# Patient Record
Sex: FEMALE | Race: WHITE | NOT HISPANIC OR LATINO | Employment: OTHER | URBAN - METROPOLITAN AREA
[De-identification: names, ages, dates, MRNs, and addresses within clinical notes are randomized per-mention and may not be internally consistent; named-entity substitution may affect disease eponyms.]

---

## 2021-11-30 ENCOUNTER — OFFICE VISIT (OUTPATIENT)
Dept: OBSTETRICS AND GYNECOLOGY | Age: 26
End: 2021-11-30

## 2021-11-30 VITALS
BODY MASS INDEX: 53.92 KG/M2 | HEIGHT: 62 IN | WEIGHT: 293 LBS | SYSTOLIC BLOOD PRESSURE: 134 MMHG | DIASTOLIC BLOOD PRESSURE: 82 MMHG

## 2021-11-30 DIAGNOSIS — Z76.89 ENCOUNTER TO ESTABLISH CARE: ICD-10-CM

## 2021-11-30 DIAGNOSIS — N94.89 OTHER SPECIFIED CONDITIONS ASSOCIATED WITH FEMALE GENITAL ORGANS AND MENSTRUAL CYCLE: ICD-10-CM

## 2021-11-30 DIAGNOSIS — Z80.41 FAMILY HISTORY OF OVARIAN CANCER: ICD-10-CM

## 2021-11-30 DIAGNOSIS — Z01.419 ENCOUNTER FOR GYNECOLOGICAL EXAMINATION (GENERAL) (ROUTINE) WITHOUT ABNORMAL FINDINGS: Primary | ICD-10-CM

## 2021-11-30 DIAGNOSIS — N92.6 IRREGULAR MENSES: ICD-10-CM

## 2021-11-30 DIAGNOSIS — Z12.4 SCREENING FOR CERVICAL CANCER: ICD-10-CM

## 2021-11-30 LAB
B-HCG UR QL: NEGATIVE
EXPIRATION DATE: NORMAL
INTERNAL NEGATIVE CONTROL: NEGATIVE
INTERNAL POSITIVE CONTROL: POSITIVE
Lab: NORMAL

## 2021-11-30 PROCEDURE — 81025 URINE PREGNANCY TEST: CPT | Performed by: OBSTETRICS & GYNECOLOGY

## 2021-11-30 PROCEDURE — 99213 OFFICE O/P EST LOW 20 MIN: CPT | Performed by: OBSTETRICS & GYNECOLOGY

## 2021-11-30 PROCEDURE — 3008F BODY MASS INDEX DOCD: CPT | Performed by: OBSTETRICS & GYNECOLOGY

## 2021-11-30 PROCEDURE — 99385 PREV VISIT NEW AGE 18-39: CPT | Performed by: OBSTETRICS & GYNECOLOGY

## 2021-11-30 RX ORDER — BLOOD-GLUCOSE METER
EACH MISCELLANEOUS
COMMUNITY
Start: 2021-09-10

## 2021-11-30 RX ORDER — BLOOD SUGAR DIAGNOSTIC
1 STRIP MISCELLANEOUS DAILY
COMMUNITY
Start: 2021-09-10

## 2021-11-30 RX ORDER — RITUXIMAB 10 MG/ML
INJECTION, SOLUTION INTRAVENOUS
COMMUNITY
Start: 2021-08-16

## 2021-11-30 RX ORDER — LEVOTHYROXINE SODIUM 0.15 MG/1
150 TABLET ORAL DAILY
COMMUNITY

## 2021-11-30 RX ORDER — BUPROPION HYDROCHLORIDE 150 MG/1
150 TABLET ORAL DAILY
COMMUNITY
Start: 2021-03-09 | End: 2022-03-09

## 2021-11-30 RX ORDER — METFORMIN HYDROCHLORIDE 500 MG/1
2 TABLET, EXTENDED RELEASE ORAL 2 TIMES DAILY
COMMUNITY
Start: 2021-09-09

## 2021-11-30 RX ORDER — LANCETS
EACH MISCELLANEOUS
COMMUNITY
Start: 2021-09-10

## 2021-11-30 RX ORDER — CLONIDINE HYDROCHLORIDE 0.1 MG/1
TABLET ORAL
COMMUNITY
Start: 2021-06-13

## 2021-11-30 RX ORDER — FLUTICASONE PROPIONATE 50 MCG
SPRAY, SUSPENSION (ML) NASAL
COMMUNITY
Start: 2021-08-30

## 2021-11-30 RX ORDER — FUROSEMIDE 40 MG/1
40 TABLET ORAL DAILY
COMMUNITY
Start: 2021-06-28

## 2021-11-30 RX ORDER — LAMOTRIGINE 25 MG/1
100 TABLET ORAL
COMMUNITY
Start: 2021-11-16

## 2021-11-30 NOTE — PROGRESS NOTES
"Routine Annual Visit    2021    Patient: Amanda Gabehart          MR#:[id number]      Chief Complaint   Patient presents with   • Gynecologic Exam     NGYN - AE today, Last pap several years ago, LMP 10/03/2021   • Establish Care       History of Present Illness    26 y.o. female  who presents for annual exam.   New pt see written gyn  Needs AE and pap but also has multiple concerns    1. 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 Possible infertility , see above  Will check SA and labs and US    3. Possible PCOS- habitus fits with diagnosis, menses have been regular up to recent    4. FH ovarian cancer  Aunt with rapid progression of ovarian cancer and  age 61  No BRCA testing , will test pt today    5. Pt disabled due to her chronic lung illness  She does desire pregnancy if possible   Doing pulmonary rehab 3 times weekly and has lost 25 lbs            Patient's last menstrual period was 10/03/2021 (exact date).  Obstetric History:  OB History        0    Para   0    Term   0       0    AB   0    Living   0       SAB   0    IAB   0    Ectopic   0    Molar   0    Multiple   0    Live Births   0               Menstrual History:     Patient's last menstrual period was 10/03/2021 (exact date).       Sexual History:       ________________________________________  There is no problem list on file for this patient.      Past Medical History:   Diagnosis Date   • Anxiety    • Borderline personality disorder (HCC)    • Depression    • Disease of thyroid gland    • Lung disease     GPA   • Lymphedema    • Polycystic ovary syndrome    • Sleep apnea        Past Surgical History:   Procedure Laterality Date   • BRONCHOSCOPY     • LUNG BIOPSY     • " "PORTACATH PLACEMENT      Placed and removed       Social History     Tobacco Use   Smoking Status Never Smoker   Smokeless Tobacco Never Used       has a current medication list which includes the following prescription(s): accu-chek softclix lancets, accu-chek guide me, bupropion xl, clonidine, accu-chek guide, lamotrigine, levothyroxine, metformin er, rituxan, sertraline, fluticasone, and furosemide.  ________________________________________    Current contraception: none  History of abnormal Pap smear: no  Family history of Breast cancer: no        The following portions of the patient's history were reviewed and updated as appropriate: allergies, current medications, past family history, past medical history, past social history, past surgical history and problem list.    Review of Systems    Pertinent items are noted in HPI.     Objective   Physical Exam    /82   Ht 157.5 cm (62\")   Wt (!) 160 kg (353 lb 6.4 oz)   LMP 10/03/2021 (Exact Date)   Breastfeeding No   BMI 64.64 kg/m²    BP Readings from Last 3 Encounters:   11/30/21 134/82      Wt Readings from Last 3 Encounters:   11/30/21 (!) 160 kg (353 lb 6.4 oz)      BMI: Estimated body mass index is 64.64 kg/m² as calculated from the following:    Height as of this encounter: 157.5 cm (62\").    Weight as of this encounter: 160 kg (353 lb 6.4 oz).      General:   alert, appears stated age and cooperative   Abdomen: soft, non-tender, without masses or organomegaly   Breast: inspection negative, no nipple discharge or bleeding, no masses or nodularity palpable   Vulva: normal   Vagina: normal mucosa   Cervix: no cervical motion tenderness, no lesions and difficult to visualize cervix   Uterus: normal size, mobile, non-tender and exam is limited habitus    Adnexa: exam limited by habitus     Assessment:    1. Normal annual exam   Assessment     ICD-10-CM ICD-9-CM   1. Encounter for gynecological examination (general) (routine) without abnormal findings  " Z01.419 V72.31   2. Irregular menses  N92.6 626.4   3. Encounter to establish care  Z76.89 V65.8   4. Screening for cervical cancer  Z12.4 V76.2   5. Other specified conditions associated with female genital organs and menstrual cycle   N94.89 629.89   6. Family history of ovarian cancer  Z80.41 V16.41     Plan:    Plan       [x]  PAP done  [x]  Labs:   []  GC/Chl/TV          Diagnoses and all orders for this visit:    1. Encounter for gynecological examination (general) (routine) without abnormal findings (Primary)    2. Irregular menses  -     POC Pregnancy, Urine  -     HCG, B-subunit, Quantitative  -     Antimullerian Hormone (AMH)  -     Follicle Stimulating Hormone  -     TSH    3. Encounter to establish care    4. Screening for cervical cancer  -     IGP, Rfx Aptima HPV ASCU    5. Other specified conditions associated with female genital organs and menstrual cycle   -     HCG, B-subunit, Quantitative    6. Family history of ovarian cancer  -     INVITAE HEREDITARY CANCER; Future      Follow up 3 weeks for gyn US and review all results    Counseling:  --Nutrition: Stressed importance of moderation and caloric balance, stressed fresh fruit and vegetables  --Exercise: Stressed the importance of regular exercise. 3-5 times weekly       --Discussed pap smear screening recommendations

## 2021-12-02 LAB
CONV .: NORMAL
CYTOLOGIST CVX/VAG CYTO: NORMAL
CYTOLOGY CVX/VAG DOC CYTO: NORMAL
CYTOLOGY CVX/VAG DOC THIN PREP: NORMAL
DX ICD CODE: NORMAL
HIV 1 & 2 AB SER-IMP: NORMAL
OTHER STN SPEC: NORMAL
STAT OF ADQ CVX/VAG CYTO-IMP: NORMAL

## 2021-12-04 LAB
FSH SERPL-ACNC: 13.1 MIU/ML
HCG INTACT+B SERPL-ACNC: <1 MIU/ML
MIS SERPL-MCNC: 0.17 NG/ML
TSH SERPL DL<=0.005 MIU/L-ACNC: 2.41 UIU/ML (ref 0.45–4.5)

## 2021-12-06 ENCOUNTER — TELEPHONE (OUTPATIENT)
Dept: OBSTETRICS AND GYNECOLOGY | Age: 26
End: 2021-12-06

## 2021-12-06 NOTE — TELEPHONE ENCOUNTER
Spoke with pt  AMH is low, ? Long term treatment for her lung syndrome  Pt says FH of fertility as well  Recommend RE referral   Pt to consider   will do SA soon.

## 2021-12-21 ENCOUNTER — OFFICE VISIT (OUTPATIENT)
Dept: OBSTETRICS AND GYNECOLOGY | Age: 26
End: 2021-12-21

## 2021-12-21 VITALS
HEIGHT: 62 IN | WEIGHT: 293 LBS | BODY MASS INDEX: 53.92 KG/M2 | DIASTOLIC BLOOD PRESSURE: 80 MMHG | SYSTOLIC BLOOD PRESSURE: 130 MMHG

## 2021-12-21 DIAGNOSIS — N92.6 IRREGULAR MENSES: Primary | ICD-10-CM

## 2021-12-21 PROCEDURE — 99212 OFFICE O/P EST SF 10 MIN: CPT | Performed by: OBSTETRICS & GYNECOLOGY

## 2021-12-21 RX ORDER — PREDNISONE 10 MG/1
TABLET ORAL
COMMUNITY
Start: 2021-12-13 | End: 2022-01-12

## 2021-12-21 RX ORDER — PIMECROLIMUS 10 MG/G
CREAM TOPICAL
COMMUNITY
Start: 2021-12-16

## 2021-12-21 RX ORDER — IPRATROPIUM BROMIDE AND ALBUTEROL SULFATE 2.5; .5 MG/3ML; MG/3ML
3 SOLUTION RESPIRATORY (INHALATION)
COMMUNITY
Start: 2021-12-03 | End: 2022-12-03

## 2021-12-21 RX ORDER — IPRATROPIUM BROMIDE AND ALBUTEROL SULFATE 2.5; .5 MG/3ML; MG/3ML
SOLUTION RESPIRATORY (INHALATION)
COMMUNITY
Start: 2021-12-03

## 2021-12-21 NOTE — PROGRESS NOTES
GYN Visit    2021    Patient: Amanda Gabehart          MR#:[id number]      Chief Complaint   Patient presents with   • Follow-up     U/S Today, Follow up to go over patient results.       History of Present Illness    26 y.o. female  who presents for  Follow up to irregular menses   US done today  No menses yet, has skipped almost 2 cycles  No HF or NS  Reviewed labs and US done today  US today is normal with thin lining  Pt also recently had flare of lung disease and is on prednisone    Reviewed low AMH and implications, mother went through menopause at age 38  Pt has had chemotherapy for her lung disease and no ovarian protection     hasnt done SA yet        Patient's last menstrual period was 2021.    ________________________________________  There is no problem list on file for this patient.      Past Medical History:   Diagnosis Date   • Anxiety    • Borderline personality disorder (HCC)    • Depression    • Disease of thyroid gland    • Lung disease     GPA   • Lymphedema    • Polycystic ovary syndrome    • Sleep apnea        Past Surgical History:   Procedure Laterality Date   • BRONCHOSCOPY     • LUNG BIOPSY     • PORTACATH PLACEMENT      Placed and removed       Social History     Tobacco Use   Smoking Status Never Smoker   Smokeless Tobacco Never Used       has a current medication list which includes the following prescription(s): accu-chek softclix lancets, accu-chek guide me, bupropion xl, clonidine, fluticasone, furosemide, accu-chek guide, ipratropium-albuterol, ipratropium-albuterol, lamotrigine, levothyroxine, metformin er, pimecrolimus, prednisone, rituxan, and sertraline.  ________________________________________    Current contraception: none      The following portions of the patient's history were reviewed and updated as appropriate: allergies, current medications, past family history, past medical history, past social history, past surgical history and problem  "list.    Review of Systems    Pertinent items are noted in HPI.     Objective   Physical Exam    /80   Ht 157.5 cm (62\")   Wt (!) 157 kg (345 lb 12.8 oz)   LMP 11/19/2021   Breastfeeding No   BMI 63.25 kg/m²    BP Readings from Last 3 Encounters:   12/21/21 130/80   11/30/21 134/82      Wt Readings from Last 3 Encounters:   12/21/21 (!) 157 kg (345 lb 12.8 oz)   11/30/21 (!) 160 kg (353 lb 6.4 oz)      BMI: Estimated body mass index is 63.25 kg/m² as calculated from the following:    Height as of this encounter: 157.5 cm (62\").    Weight as of this encounter: 157 kg (345 lb 12.8 oz).    Lungs: non labored breathing, no wheezing or tachpnea  Extremities: extremities normal, atraumatic, no cyanosis or edema  Skin: Skin color, texture, turgor normal. No rashes or lesions  Neurologic: Grossly normal  General:   alert, appears stated age and cooperative   Abdomen: soft, non-tender, without masses or organomegaly       Vulva: normal   Vagina: normal mucosa   Cervix: no cervical motion tenderness   Uterus: normal size, mobile and non-tender   Adnexa: no mass, fullness, tenderness     Assessment:      Diagnoses and all orders for this visit:    1. Irregular menses (Primary)      See US normal     Offered provera WD , pt declines, wants to just observe    Fu 3 months, pts  to do SA  Reviewed normal US  If pt desires pregnancy recommend seeing RE        "

## 2022-03-31 ENCOUNTER — OFFICE VISIT (OUTPATIENT)
Dept: OBSTETRICS AND GYNECOLOGY | Age: 27
End: 2022-03-31

## 2022-03-31 VITALS
DIASTOLIC BLOOD PRESSURE: 84 MMHG | BODY MASS INDEX: 53.92 KG/M2 | WEIGHT: 293 LBS | HEIGHT: 62 IN | SYSTOLIC BLOOD PRESSURE: 128 MMHG

## 2022-03-31 DIAGNOSIS — N92.6 IRREGULAR MENSES: ICD-10-CM

## 2022-03-31 DIAGNOSIS — L29.2 VULVAR ITCHING: Primary | ICD-10-CM

## 2022-03-31 PROCEDURE — 99213 OFFICE O/P EST LOW 20 MIN: CPT | Performed by: OBSTETRICS & GYNECOLOGY

## 2022-03-31 NOTE — PROGRESS NOTES
"GYN Visit    3/31/2022    Patient: Amanda Gabehart          MR#:[id number]      Chief Complaint   Patient presents with   • Follow-up     3 Month GYN Recheck, pt C/O severe itching       History of Present Illness    27 y.o. female  who presents for follow up and new problem of vulvar itching     Monthly cycles x 3    did not do SA  Not preventing pregnancy    Complaints of intense itching, anterior vulvar area, for past 3 months  No discharge, no odor      Patient's last menstrual period was 2022 (exact date).    ________________________________________  There is no problem list on file for this patient.      Past Medical History:   Diagnosis Date   • Anxiety    • Borderline personality disorder (HCC)    • Depression    • Disease of thyroid gland    • Lung disease     GPA   • Lymphedema    • Polycystic ovary syndrome    • Sleep apnea        Past Surgical History:   Procedure Laterality Date   • BRONCHOSCOPY     • LUNG BIOPSY     • PORTACATH PLACEMENT      Placed and removed       Social History     Tobacco Use   Smoking Status Never Smoker   Smokeless Tobacco Never Used       has a current medication list which includes the following prescription(s): clonidine, fluticasone, furosemide, accu-chek guide, ipratropium-albuterol, ipratropium-albuterol, lamotrigine, levothyroxine, metformin er, pimecrolimus, rituxan, sertraline, accu-chek softclix lancets, betamethasone valerate, accu-chek guide me, and bupropion xl.  ________________________________________    Current contraception: none      The following portions of the patient's history were reviewed and updated as appropriate: allergies, current medications, past family history, past medical history, past social history, past surgical history and problem list.    Review of Systems    Pertinent items are noted in HPI.     Objective   Physical Exam  Genitourinary:            /84   Ht 157.5 cm (62\")   Wt (!) 154 kg (339 lb)   LMP 2022 " "(Exact Date)   Breastfeeding No   BMI 62.00 kg/m²    BP Readings from Last 3 Encounters:   03/31/22 128/84   12/21/21 130/80   11/30/21 134/82      Wt Readings from Last 3 Encounters:   03/31/22 (!) 154 kg (339 lb)   12/21/21 (!) 157 kg (345 lb 12.8 oz)   11/30/21 (!) 160 kg (353 lb 6.4 oz)      BMI: Estimated body mass index is 62 kg/m² as calculated from the following:    Height as of this encounter: 157.5 cm (62\").    Weight as of this encounter: 154 kg (339 lb).    Lungs: non labored breathing, no wheezing or tachpnea  Extremities: extremities normal, atraumatic, no cyanosis or edema  Skin: Skin color, texture, turgor normal. No rashes or lesions  Neurologic: Grossly normal  General:   alert, appears stated age and cooperative   Abdomen: soft, non-tender, without masses or organomegaly       Vulva: erythema anterior vulva, no mass,   Vagina: normal mucosa, normal discharge                 Assessment:      Diagnoses and all orders for this visit:    1. Vulvar itching (Primary)    2. Irregular menses    Other orders  -     betamethasone valerate (VALISONE) 0.1 % ointment; Apply 1 application topically to the appropriate area as directed 2 (Two) Times a Day. Apply BID for 1 week for flare up , apply 2-3 times weekly for maintenance  Dispense: 45 g; Refill: 1      Suspect eczema - will do steroid ointment  Irregular menses seems to have resolved  Pt on biologic every 6 months, CI in pregnancy- recommend condoms  If decides on pregnancy may need RE  May need steroids as treatment during a pregnancy        "

## 2022-04-06 ENCOUNTER — TELEPHONE (OUTPATIENT)
Dept: OBSTETRICS AND GYNECOLOGY | Age: 27
End: 2022-04-06

## 2022-04-06 NOTE — TELEPHONE ENCOUNTER
PT calls stating she now has [facility] not [facility] and would like her valisone cream to be sent in to [facility], please advise, pharmacy verified

## 2022-09-08 ENCOUNTER — TRANSCRIBE ORDERS (OUTPATIENT)
Dept: ADMINISTRATIVE | Facility: HOSPITAL | Age: 27
End: 2022-09-08

## 2022-09-08 DIAGNOSIS — Z92.25 STATUS POST RECENT IMMUNOSUPPRESSIVE THERAPY: Primary | ICD-10-CM

## 2022-09-14 DIAGNOSIS — Z92.25 STATUS POST RECENT IMMUNOSUPPRESSIVE THERAPY: ICD-10-CM

## 2022-09-14 RX ORDER — EPINEPHRINE 1 MG/ML
0.3 INJECTION, SOLUTION INTRAMUSCULAR; SUBCUTANEOUS AS NEEDED
Status: CANCELLED | OUTPATIENT
Start: 2022-09-15

## 2022-09-14 RX ORDER — DIPHENHYDRAMINE HCL 50 MG
50 CAPSULE ORAL ONCE AS NEEDED
Status: CANCELLED | OUTPATIENT
Start: 2022-09-15

## 2022-09-15 ENCOUNTER — INFUSION (OUTPATIENT)
Dept: ONCOLOGY | Facility: HOSPITAL | Age: 27
End: 2022-09-15

## 2022-09-15 VITALS
OXYGEN SATURATION: 98 % | RESPIRATION RATE: 18 BRPM | TEMPERATURE: 97.4 F | WEIGHT: 293 LBS | HEIGHT: 63 IN | SYSTOLIC BLOOD PRESSURE: 140 MMHG | DIASTOLIC BLOOD PRESSURE: 78 MMHG | HEART RATE: 78 BPM | BODY MASS INDEX: 51.91 KG/M2

## 2022-09-15 DIAGNOSIS — Z92.25 STATUS POST RECENT IMMUNOSUPPRESSIVE THERAPY: Primary | ICD-10-CM

## 2022-09-15 RX ORDER — DIPHENHYDRAMINE HCL 25 MG
50 TABLET ORAL ONCE AS NEEDED
Status: CANCELLED | OUTPATIENT
Start: 2022-09-15

## 2022-09-15 RX ORDER — EPINEPHRINE 1 MG/ML
0.3 INJECTION, SOLUTION, CONCENTRATE INTRAVENOUS AS NEEDED
Status: DISCONTINUED | OUTPATIENT
Start: 2022-09-15 | End: 2022-09-15

## 2022-09-15 RX ORDER — DIPHENHYDRAMINE HCL 25 MG
50 TABLET ORAL ONCE AS NEEDED
Status: DISCONTINUED | OUTPATIENT
Start: 2022-09-15 | End: 2022-09-15

## 2022-09-15 RX ORDER — EPINEPHRINE 1 MG/ML
0.3 INJECTION, SOLUTION, CONCENTRATE INTRAVENOUS AS NEEDED
Status: CANCELLED | OUTPATIENT
Start: 2022-09-15

## 2022-09-15 NOTE — NURSING NOTE
Here for TESSY; states that she has not been told about the medication. Presented her with the printed material and discussion; however when the drug was released from the treatment plan an alert was for contraindication as she had listed ANAPHYLAXIS with rituxan. After much investigation per Candie, [facility] and pharmacists at hospital call was placed to ordering provider, Dr. Cassidy. She returned call and after speaking to Candie she stated that she was going to call patient and the drug was to be held until Dr. Cassidy called back to the pharmacist. In lieu of time constraint in the afternoon, it was decided and agreed with patient that she would proceed with her scheduled rituxan infusion at other out pt facility tomorrow and would receive a call from this practice advising her on location of Evusheld administration; ie outpatient vs inpatient. She has been provided the phone number to reschedule Evusheld if in fact she will receive treatment at [facility]. Awaiting final response to inform patient.

## 2022-09-19 NOTE — NURSING NOTE
"Per Dr. Cassidy, pt.received rituxan 09- and is to schedule the Evusheld \" a few days after that\". Called again to patient and provided her with the [phone] phone no. for scheduling and gave her the above info. She states she will schedule.  "

## 2022-09-20 ENCOUNTER — TRANSCRIBE ORDERS (OUTPATIENT)
Dept: ADMINISTRATIVE | Facility: HOSPITAL | Age: 27
End: 2022-09-20

## 2022-09-20 DIAGNOSIS — Z92.25 STATUS POST RECENT IMMUNOSUPPRESSIVE THERAPY: Primary | ICD-10-CM

## 2022-09-26 ENCOUNTER — INFUSION (OUTPATIENT)
Dept: ONCOLOGY | Facility: HOSPITAL | Age: 27
End: 2022-09-26

## 2022-09-26 VITALS
SYSTOLIC BLOOD PRESSURE: 127 MMHG | DIASTOLIC BLOOD PRESSURE: 82 MMHG | RESPIRATION RATE: 18 BRPM | HEIGHT: 63 IN | BODY MASS INDEX: 51.91 KG/M2 | OXYGEN SATURATION: 98 % | WEIGHT: 293 LBS | TEMPERATURE: 96.7 F | HEART RATE: 88 BPM

## 2022-09-26 DIAGNOSIS — Z92.25 STATUS POST RECENT IMMUNOSUPPRESSIVE THERAPY: Primary | ICD-10-CM

## 2022-09-26 PROCEDURE — 25010000002 INJECTION, TIXAGEVIMAB AND CILGAVIMAB,: Performed by: INTERNAL MEDICINE

## 2022-09-26 PROCEDURE — M0220 HC INJECTION, TIXAGEVIMAB AND CILGAVIMAB: HCPCS | Performed by: INTERNAL MEDICINE

## 2022-09-26 RX ORDER — EPINEPHRINE 1 MG/ML
0.3 INJECTION, SOLUTION, CONCENTRATE INTRAVENOUS AS NEEDED
Status: DISCONTINUED | OUTPATIENT
Start: 2022-09-26 | End: 2022-09-26 | Stop reason: HOSPADM

## 2022-09-26 RX ORDER — DIPHENHYDRAMINE HCL 25 MG
50 CAPSULE ORAL ONCE AS NEEDED
Status: DISCONTINUED | OUTPATIENT
Start: 2022-09-26 | End: 2022-09-26 | Stop reason: HOSPADM

## 2022-09-26 RX ORDER — DIPHENHYDRAMINE HCL 50 MG
50 CAPSULE ORAL ONCE AS NEEDED
Status: CANCELLED | OUTPATIENT
Start: 2022-09-26

## 2022-09-26 RX ORDER — EPINEPHRINE 0.1 MG/ML
0.3 SYRINGE (ML) INJECTION AS NEEDED
Status: CANCELLED | OUTPATIENT
Start: 2022-09-26

## 2022-09-26 RX ORDER — EPINEPHRINE 1 MG/ML
0.3 INJECTION, SOLUTION, CONCENTRATE INTRAVENOUS AS NEEDED
Status: CANCELLED | OUTPATIENT
Start: 2022-09-26

## 2022-09-26 RX ORDER — DIPHENHYDRAMINE HCL 25 MG
50 TABLET ORAL ONCE AS NEEDED
Status: CANCELLED | OUTPATIENT
Start: 2022-09-26

## 2022-09-26 RX ADMIN — AZD7442 300 MG: KIT at 15:45

## 2022-09-26 NOTE — NURSING NOTE
Here for TESSY . She states that she has read the information as it was presented to her the last visit in; denies questions. Also the f/u appt has been verified.

## 2024-07-16 ENCOUNTER — TRANSCRIBE ORDERS (OUTPATIENT)
Dept: PHYSICAL THERAPY | Facility: CLINIC | Age: 29
End: 2024-07-16
Payer: MEDICARE

## 2024-07-16 DIAGNOSIS — M25.50 ARTHRALGIA, UNSPECIFIED JOINT: Primary | ICD-10-CM

## 2024-10-07 ENCOUNTER — TREATMENT (OUTPATIENT)
Dept: PHYSICAL THERAPY | Facility: CLINIC | Age: 29
End: 2024-10-07
Payer: MEDICARE

## 2024-10-07 DIAGNOSIS — I77.6 VASCULITIS: ICD-10-CM

## 2024-10-07 DIAGNOSIS — R26.2 DIFFICULTY WALKING: ICD-10-CM

## 2024-10-07 DIAGNOSIS — M25.50 POLYARTHRALGIA: Primary | ICD-10-CM

## 2024-10-07 DIAGNOSIS — Z78.9 DIFFICULTY WITH ACTIVITIES OF DAILY LIVING: ICD-10-CM

## 2024-10-07 DIAGNOSIS — E66.813 CLASS 3 SEVERE OBESITY WITH SERIOUS COMORBIDITY AND BODY MASS INDEX (BMI) OF 60.0 TO 69.9 IN ADULT, UNSPECIFIED OBESITY TYPE: ICD-10-CM

## 2024-10-07 DIAGNOSIS — E66.01 CLASS 3 SEVERE OBESITY WITH SERIOUS COMORBIDITY AND BODY MASS INDEX (BMI) OF 60.0 TO 69.9 IN ADULT, UNSPECIFIED OBESITY TYPE: ICD-10-CM

## 2024-10-07 DIAGNOSIS — E66.9 LYMPHEDEMA ASSOCIATED WITH OBESITY: ICD-10-CM

## 2024-10-07 DIAGNOSIS — I89.0 LYMPHEDEMA ASSOCIATED WITH OBESITY: ICD-10-CM

## 2024-10-07 PROCEDURE — 97162 PT EVAL MOD COMPLEX 30 MIN: CPT | Performed by: PHYSICAL THERAPIST

## 2024-10-07 NOTE — PROGRESS NOTES
[facility]  [facility]  [address] [place] [address]    Initial Evaluation and Plan of Care      Patient: Amanda Gabehart   : 1995  Diagnosis/ICD-10 Code:  Polyarthralgia [M25.50]  Referring practitioner: Clara Abbott MD  Date of Initial Visit: 10/7/2024  Today's Date: 10/7/2024  Patient seen for 1 session         Visit Diagnoses:    ICD-10-CM ICD-9-CM   1. Polyarthralgia  M25.50 719.49   2. Lymphedema associated with obesity  I89.0 457.1    E66.9 278.00   3. Vasculitis  I77.6 447.6   4. Difficulty walking  R26.2 719.7   5. Difficulty with activities of daily living  Z78.9 V49.89         Subjective Questionnaire: PSFS 37%      Subjective Evaluation    History of Present Illness  Mechanism of injury: 29 year old female dx with vasculitis at age 10 primarily affecting her lungs but also skin and joints, especially in her hands.   Pt with lymphedema using a compression device at home prn.   Pt receives infusion therapy for her vasculitis and this frequency has been spread out more which she feels has contributed to her increased joint pain. Pt weighs 370# which is 21# more than noted on EMR.   Pt states she is usually on room air but can go from 98 to 88% quickly and will be on O2. Can bring O2 to her PT rx if needed.  Referred for aquatic therapy. Did come to our pool on her own in [phone] as a fitness member. Eager to learn how to exercise in the water safely. Feels she can use the cool or warm water. Understands being in deeper water can increase pressure on her lungs.   Pt with dx of borderline personality disorder and has been going to [facility] on a daily basis for out-pt psychiatric care. Pt feels like she benefited greatly from this facility and is eager to pursue aquatic therapy.   Pt also states she does have some joint hypermobility but has been tested for EDS which was negative.         Patient Occupation: disability since age 23 Quality of life:  fair    Pain  Current pain rating: 3  At best pain ratin  At worst pain ratin  Location: MP joints of both hands.  Quality: dull ache, cramping, tight and discomfort  Relieving factors: change in position, medications and relaxation  Aggravating factors: stairs, ambulation and standing  Progression: worsening    Diagnostic Tests  No diagnostic tests performed    Treatments  No previous or current treatments  Patient Goals  Patient goals for therapy: decreased pain, increased motion and increased strength  Patient goal: improve abiliity to manage stairs, stand and walk for ADLs.           Objective          Active Range of Motion   Left Shoulder   Normal active range of motion    Right Shoulder   Normal active range of motion    Left Wrist   Normal active range of motion    Right Wrist   Normal active range of motion    Lumbar   Flexion: WFL  Extension: WFL  Left lateral flexion: 15 degrees   Right lateral flexion: 15 degrees   Left Knee   Normal active range of motion    Right Knee   Normal active range of motion  Left Ankle/Foot   Normal active range of motion    Right Ankle/Foot   Normal active range of motion    Additional Active Range of Motion Details  Hypermobile in MP extension of all digits.   Hyperextension B    Strength/Myotome Testing     Lumbar   Left   Normal strength    Right   Normal strength    Left Knee   Normal strength    Right Knee   Normal strength    Left Ankle/Foot   Normal strength    Right Ankle/Foot   Normal strength    Ambulation   Weight-Bearing Status   Assistive device used: none    Ambulation: Level Surfaces   Ambulation with assistive device: independent    Ambulation: Stairs   Ascend stairs: independent  Pattern: non-reciprocal  Railings: one rail  Descend stairs: independent  Pattern: non-reciprocal  Railings: one rail    Observational Gait   Decreased walking speed, stride length, left step length and right step length.   Left foot contact pattern: foot flat  Right foot  contact pattern: foot flat    Additional Observational Gait Details  Gait pattern significantly affected by obesity    Quality of Movement During Gait     Knee    Knee (Left): Positive valgus.   Knee (Right): Positive valgus.     Functional Assessment     Single Leg Stance   Left: 5 seconds  Right: 5 seconds     General Comments     Ankle/Foot Comments   Significant LE lymphedema beverly at ankles and feet.          Assessment & Plan       Assessment  Impairments: abnormal gait, activity intolerance and lacks appropriate home exercise program   Functional limitations: walking, uncomfortable because of pain and standing   Assessment details: Pt with stable condition although worsening of late due to possible length of time between her infusions for her vasculitis.   Pt with significant co-morbidity of obesity with lung disease affecting pulmonary function at times. Pt was tested at rest with O2 SATs 98% and HR sitting at 105. Will suggest periodic assessment of vital signs during exertion in the water. May need to work in cool vs. Warm pool, and shallow vs. Deeper water if SATs are affected.   Pt was cooperative and in no distress today and appears ready to pursue aquatic therapy.   Prognosis: fair  Prognosis details: Pt filled out PSFS with stairs 4/10, stamina when walking 4/10, stamina when standing 3/10. 11/30 = 37%    Goals  Plan Goals: STGs 4 weeks:  1. Pt to be able to tolerate 30 minutes of aquatic therapy with stable vital signs and no need for supplemental oxygen.   2. Pt to be able to indep do a light and basic aquatic program   3. Pt to report improvement in her PSFS moving from 37 to > 50% score  LTGs 12 weeks:  1. Pt to report improvement in general arthralgia in affected joints stating > 25% improvement in pain  2. Pt to be able to do a moderate level aquatic program with full indep  3. PSFS score > 60%  4. Pt to have a long term plan for ex post dc from PT.  5. Pt to state 5-10# wt loss via PT  intervention and increased general activity.     Plan  Therapy options: will be seen for skilled therapy services  Other planned therapy interventions: aquatic therapy, group therapy  Frequency: 2x week  Duration in weeks: 12  Treatment plan discussed with: patient        History # of Personal Factors and/or Comorbidities: HIGH (3+)  Examination of Body System(s): # of elements: MODERATE (3)  Clinical Presentation: STABLE   Clinical Decision Making: MODERATE      Timed:         Manual Therapy:         mins  30752;     Therapeutic Exercise:         mins  86495;     Neuromuscular Ernst:        mins  77419;    Therapeutic Activity:          mins  68335;     Gait Training:           mins  21552;     Ultrasound:          mins  11391;    Ionto                                   mins   34052  Self Care                            mins   02871  Canalith Repos         mins 15942  Aquatic Exercise                 mins 97506    Un-Timed:  Electrical Stimulation:         mins  96897 ( );  Dry Needling          mins self-pay  Traction          mins 53329    Low Eval          Mins  44004  Mod Eval     40     Mins  66164  High Eval                            Mins  50043        Timed Treatment:   0   mins   Total Treatment:     40   mins          PT: Zorre Zeno Kimura, PT     [place] License Number: [id number]  [place] License Number:  [id number]  Electronically signed by Zorre Zeno Kimura, PT, 10/07/24, 1:26 PM EDT    Certification Period: 10/7/2024 thru 1/4/2025  I certify that the therapy services are furnished while this patient is under my care.  The services outlined above are required by this patient, and will be reviewed every 90 days.         Physician Signature:__________________________________________________    PHYSICIAN: Clara Abbott MD  NPI: [id number]                                      DATE:      Please sign and return via fax to [employer]  [address] [place] [address]. [phone]  Thank you, [facility]  Physical Therapy.

## 2024-10-09 ENCOUNTER — TREATMENT (OUTPATIENT)
Dept: PHYSICAL THERAPY | Facility: CLINIC | Age: 29
End: 2024-10-09
Payer: MEDICARE

## 2024-10-09 DIAGNOSIS — R26.2 DIFFICULTY WALKING: ICD-10-CM

## 2024-10-09 DIAGNOSIS — M25.50 POLYARTHRALGIA: Primary | ICD-10-CM

## 2024-10-09 DIAGNOSIS — E66.813 CLASS 3 SEVERE OBESITY WITH SERIOUS COMORBIDITY AND BODY MASS INDEX (BMI) OF 60.0 TO 69.9 IN ADULT, UNSPECIFIED OBESITY TYPE: ICD-10-CM

## 2024-10-09 DIAGNOSIS — I89.0 LYMPHEDEMA ASSOCIATED WITH OBESITY: ICD-10-CM

## 2024-10-09 DIAGNOSIS — E66.9 LYMPHEDEMA ASSOCIATED WITH OBESITY: ICD-10-CM

## 2024-10-09 DIAGNOSIS — Z78.9 DIFFICULTY WITH ACTIVITIES OF DAILY LIVING: ICD-10-CM

## 2024-10-09 DIAGNOSIS — E66.01 CLASS 3 SEVERE OBESITY WITH SERIOUS COMORBIDITY AND BODY MASS INDEX (BMI) OF 60.0 TO 69.9 IN ADULT, UNSPECIFIED OBESITY TYPE: ICD-10-CM

## 2024-10-09 DIAGNOSIS — I77.6 VASCULITIS: ICD-10-CM

## 2024-10-09 NOTE — PROGRESS NOTES
Physical Therapy Daily Treatment Note    [facility]  [address] [place] [address]  [phone] (phone)  [phone] (fax)    Patient: Amanda Gabehart   : 1995  Diagnosis/ICD-10 Code:  Polyarthralgia [M25.50]  Referring practitioner: Clara Abbott MD  Date of Initial Visit: Type: THERAPY  Noted: 10/7/2024  Today's Date: 10/9/2024  Patient seen for 2 sessions             Subjective   Water exercise helps breathing compared to land based exercise or walking.    Objective   Treatment in cooler lap pool.    Water Walk Fwds/Backwards X 100 yds                             Alternating Sidesteps right and left X10 each w/ foam dumbbell horiz Abd/Add    Straddling Noodle while floating and Bicycling Legs X 2 min. (Tried various other noodle positions that did not work)  Shoulder Abd w/ medium foam dumbbells X 20                                            Vertical Traction  2 min.        Abdominals   Large Noodle Pushdowns X 15                                  Hip Abd/Add   --            Hip Flex/Ext    --               March in Place 2 X10 (w/ and w/o Noodle support)          Mini Squat  15X                                                     Step Ups    6 inch  X 10 each leg                                             Flutter/Scissor                           Assessment/Plan  O2 sat mid treatment 95%,  Post Treatment 97%   Jeannie did well with treatment provided in a cool water pool.  There was some difficulty performing suspended bicycling due to positioning/balance, however we were able to find a stable position for her. She was encouraged to wear non slip shoes due to wet floors.          Timed:  Aquatic Therapy    35     mins 61518;    Brodie Donohue, PT  Physical Therapist    [place] License:  [id number]

## 2024-10-16 ENCOUNTER — TELEPHONE (OUTPATIENT)
Dept: PHYSICAL THERAPY | Facility: CLINIC | Age: 29
End: 2024-10-16
Payer: MEDICARE

## 2024-10-16 NOTE — TELEPHONE ENCOUNTER
Caller: Gabehart, Amanda    Relationship: Self         What was the call regarding: NOT COMFORTABLE COMING IN ON O2